# Patient Record
(demographics unavailable — no encounter records)

---

## 2025-07-15 NOTE — PLAN
[FreeTextEntry1] : RIGHT OVARIAN CYST TVS/ PELVIC US MICRONOR 35mg IBUPROFEN 600mg PRN ATARAX 25mg PO QHS BENEDRYL CALADRYL LOTION RTO 2wks/ PRN

## 2025-07-15 NOTE — HISTORY OF PRESENT ILLNESS
[No] : never pregnant [TextBox_4] : NEW PATIENT PRESENT C/O RIGHT OVARIAN CYST [Mammogramdate] : -0- [BreastSonogramDate] : -0- [PapSmeardate] : -0- [BoneDensityDate] : -0- [ColonoscopyDate] : -0- [LMPDate] : 7/15/25 [PGHxTotal] : 0 [TextBox_6] : 7/15/25 [FreeTextEntry1] : 7/11/25

## 2025-07-25 NOTE — HISTORY OF PRESENT ILLNESS
[TextBox_4] : PATIENT PRESENT FOR FOLLOW UP VISIT [LMPDate] : 7/11/25 [Ultrasound] : ultrasound [TextBox_27] : TVS/ PELVIC US- 7/15/25 [No] : never pregnant [TextBox_6] : 7/11/25 [FreeTextEntry1] : 7/11/25